# Patient Record
(demographics unavailable — no encounter records)

---

## 2024-12-07 NOTE — DISCUSSION/SUMMARY
[] : The components of the vaccine(s) to be administered today are listed in the plan of care. The disease(s) for which the vaccine(s) are intended to prevent and the risks have been discussed with the caretaker.  The risks are also included in the appropriate vaccination information statements which have been provided to the patient's caregiver.  The caregiver has given consent to vaccinate. [FreeTextEntry1] :  Well 12 year F.  Continue balanced diet with all food groups. Brush teeth twice a day with toothbrush. Recommend visit to dentist. Help child to maintain consistent daily routines and sleep schedule. Personal hygiene and puberty explained. School discussed. Ensure home is safe. Teach child about personal safety. Use consistent, positive discipline. Limit screen time to no more than 2 hours per day. Encourage physical activity.  -Imms: HPV, Flu, Covid -Labs: None needed -Next well visit in 1 year.

## 2024-12-07 NOTE — HISTORY OF PRESENT ILLNESS
[Mother] : mother [Has ways to cope with stress] : has ways to cope with stress [Displays self-confidence] : displays self-confidence [Grade: ____] : Grade: [unfilled] [Normal Performance] : normal performance [Normal Behavior/Attention] : normal behavior/attention [Normal Homework] : normal homework [Yes] : Patient goes to dentist yearly [Toothpaste] : Primary Fluoride Source: Toothpaste [Premenarche] : premenarche [Eats meals with family] : eats meals with family [Is permitted and is able to make independent decisions] : Is permitted and is able to make independent decisions [Eats regular meals including adequate fruits and vegetables] : eats regular meals including adequate fruits and vegetables [Drinks non-sweetened liquids] : drinks non-sweetened liquids  [Calcium source] : calcium source [Has friends] : has friends [At least 1 hour of physical activity a day] : at least 1 hour of physical activity a day [Has interests/participates in community activities/volunteers] : has interests/participates in community activities/volunteers. [Uses safety belts/safety equipment] : uses safety belts/safety equipment  [Has peer relationships free of violence] : has peer relationships free of violence [Has family members/adults to turn to for help] : has family members/adults to turn to for help [Sleep Concerns] : no sleep concerns [Has concerns about body or appearance] : does not have concerns about body or appearance [Uses electronic nicotine delivery system] : does not use electronic nicotine delivery system [Uses tobacco] : does not use tobacco [Uses drugs] : does not use drugs  [Drinks alcohol] : does not drink alcohol [Impaired/distracted driving] : no impaired/distracted driving [Has problems with sleep] : does not have problems with sleep [Gets depressed, anxious, or irritable/has mood swings] : does not get depressed, anxious, or irritable/has mood swings [Has thought about hurting self or considered suicide] : has not thought about hurting self or considered suicide [FreeTextEntry7] : Dizziness and nausea resolved. [de-identified] : None

## 2025-01-03 NOTE — PHYSICAL EXAM
[NL] : no acute distress, alert [de-identified] : moderate edema and ecchymoses R ankle with tenderness at medial and lateral malleoli, limited extension 2/2 pain but otherwise full ROM at ankle. unable to bear weight on R.

## 2025-01-03 NOTE — HISTORY OF PRESENT ILLNESS
[de-identified] : Ankle Sprain [FreeTextEntry6] : 1 week ago was riding around on new heelies and R ankle "collapsed". Seen at , told unlikely to be a fracture but couldn't fully eval growth plate. Placed in cam walker boot and crutches and told to f/u here. Doing RICE which helps. Overall feels better and less swollen but still with significant pain and having difficulty bearing weight on that foot.

## 2025-01-03 NOTE — DISCUSSION/SUMMARY
[FreeTextEntry1] : Likely ankle sprain but given inability to bear weight 1 week from injury rec eval with ortho or sports med. Referral info given. In the interim, cont RICE, cam walker boot, WBAT. Provided with elevator pass and gym excuse for 2 weeks. Parents to call if needs an extension.

## 2025-01-10 NOTE — DATA REVIEWED
[de-identified] : 3 views right  radiographs were obtained  and independently reviewed during today's visit  01/09/25. SH1 distal tibia  fracture with interval healing. Bones are in normal alignment. Joint spaces are preserved

## 2025-01-10 NOTE — REVIEW OF SYSTEMS
[Change in Activity] : change in activity [Limping] : limping [Joint Swelling] : joint swelling  [Fever Above 102] : no fever [Itching] : no itching [Redness] : no redness [Sore Throat] : no sore throat [Wheezing] : no wheezing [Cough] : no cough [Change in Appetite] : no change in appetite [Vomiting] : no vomiting [Joint Pains] : no arthralgias

## 2025-01-10 NOTE — PHYSICAL EXAM
[FreeTextEntry1] : General: Patient is awake and alert and in no acute distress . oriented to person, place. well developed, well nourished, cooperative.   Skin: The skin is intact, warm, pink, and dry over the area examined.    Eyes: normal conjunctiva, normal eyelids and pupils were equal and round.   ENT: normal ears, normal nose and normal lips.  Cardiovascular: There is brisk capillary refill in the digits of the affected extremity. They are symmetric pulses in the bilateral upper and lower extremities, positive peripheral pulses, brisk capillary refill, but no peripheral edema.  Respiratory: The patient is in no apparent respiratory distress. They're taking full deep breaths without use of accessory muscles or evidence of audible wheezes or stridor without the use of a stethoscope, normal respiratory effort.   Neurological: 5/5 motor strength in the main muscle groups of bilateral lower extremities, sensory intact in bilateral lower extremities.   Musculoskeletal:  pain with weight bearing , using crutches right ankle with no gross deformity, swelling and pain in distal tibia  no pain over the ATFL OR DELTOID, NO PAIN OVER LATERAL MALLEOLUS Mild pain with ankle ROM NV intact

## 2025-01-10 NOTE — REASON FOR VISIT
[Initial Evaluation] : an initial evaluation [Patient] : patient [Father] : father [FreeTextEntry1] :  right ankle injury

## 2025-01-10 NOTE — ASSESSMENT
[FreeTextEntry1] : 11 yo girl with right ankle SH1 distal tibia fracture DOI 12/27/24 Today's visit included obtaining history from the child  parent due to the child's age, the child could not be considered a reliable historian, requiring parent to act as independent historian. Xray was reviewed today demonstrating INTERVAL HEALING  and Long discussion was done with family regarding  diagnosis, treatment options and prognosis She will stay in the boot, non weight bearing for 3 more weeks No gym/sports for 3 weeks  Follow up in 3 weeks for reevaluation and Xray of the right ankle Pain medication as needed This plan was discussed with family. Family verbalizes understanding and agreement of plan. All questions and concerns were addressed today.

## 2025-01-10 NOTE — HISTORY OF PRESENT ILLNESS
[FreeTextEntry1] : Nasima is a pleasant 13 yo girl who came today to my office with her  dad  for evaluation of  right ankle injury She sprained her  right ankle while skating on 12/27/24 . she immediate experienced pain with weight bearing and ankle ROM.  She was seen in urgent care, Xray was done - no fracture was diagnosed, but since she had a lot of pain she was treated with  a boot and was instructed to follow with peds ortho, She is still having pain and therefore she came for evaluation of the above.

## 2025-01-31 NOTE — REASON FOR VISIT
[Follow Up] : a follow up visit [Patient] : patient [Father] : father [FreeTextEntry1] :  right ankle distal tibia fracture

## 2025-01-31 NOTE — HISTORY OF PRESENT ILLNESS
[FreeTextEntry1] : Nasima is a pleasant 11 yo girl who came today to my office with her  dad  for evaluation of  right ankle injury She sprained her  right ankle while skating on 12/27/24 . she immediate experienced pain with weight bearing and ankle ROM.  She was seen in urgent care, Xray was done - no fracture was diagnosed, but since she had a lot of pain she was treated with  a boot and was instructed to follow with peds ortho, Last visit  SH1 OF THE DISTAL TIBIA was diagnosed  and she was instructed to remain out of gym/sport. She is here today, doing better, no pain

## 2025-01-31 NOTE — REVIEW OF SYSTEMS
[Limping] : limping [Change in Activity] : no change in activity [Fever Above 102] : no fever [Itching] : no itching [Redness] : no redness [Sore Throat] : no sore throat [Wheezing] : no wheezing [Cough] : no cough [Change in Appetite] : no change in appetite [Vomiting] : no vomiting [Joint Pains] : no arthralgias [Joint Swelling] : no joint swelling

## 2025-01-31 NOTE — DATA REVIEWED
[de-identified] : 3 views right  radiographs were obtained  and independently reviewed during today's visit  01/31 /25. SH1 distal tibia  fracture with interval healing. Bones are in normal alignment. Joint spaces are preserved

## 2025-01-31 NOTE — PHYSICAL EXAM
[FreeTextEntry1] : General: Patient is awake and alert and in no acute distress . oriented to person, place. well developed, well nourished, cooperative.   Skin: The skin is intact, warm, pink, and dry over the area examined.    Eyes: normal conjunctiva, normal eyelids and pupils were equal and round.   ENT: normal ears, normal nose and normal lips.  Cardiovascular: There is brisk capillary refill in the digits of the affected extremity. They are symmetric pulses in the bilateral upper and lower extremities, positive peripheral pulses, brisk capillary refill, but no peripheral edema.  Respiratory: The patient is in no apparent respiratory distress. They're taking full deep breaths without use of accessory muscles or evidence of audible wheezes or stridor without the use of a stethoscope, normal respiratory effort.   Neurological: 5/5 motor strength in the main muscle groups of bilateral lower extremities, sensory intact in bilateral lower extremities.   Musculoskeletal: able to bear weight with minimal pain  right ankle with no gross deformity,  resolved swelling and pain in distal tibia  no pain over the ATFL OR DELTOID, NO PAIN OVER LATERAL MALLEOLUS Mild pain with ankle ROM NV intact

## 2025-01-31 NOTE — ASSESSMENT
[FreeTextEntry1] : 13 yo girl with right ankle SH1 distal tibia fracture DOI 12/27/24 Today's visit included obtaining history from the child  parent due to the child's age, the child could not be considered a reliable historian, requiring parent to act as independent historian. Xray was reviewed today demonstrating INTERVAL HEALING  and Long discussion was done with family regarding  diagnosis, treatment options and prognosis At this point she will start PT for gait training and ankle ROM Full  weight bearing in a boot  No gym/sports for 4 weeks  Follow up in 4 weeks for reevaluation and Xray of the right ankle Pain medication as needed This plan was discussed with family. Family verbalizes understanding and agreement of plan. All questions and concerns were addressed today.

## 2025-02-28 NOTE — DATA REVIEWED
[de-identified] : 3 views right  radiographs were obtained  and independently reviewed during today's visit  02/28/25. SH1 distal tibia  fracture with interval healing. Bones are in normal alignment. Joint spaces are preserved

## 2025-02-28 NOTE — HISTORY OF PRESENT ILLNESS
[FreeTextEntry1] : Nasima is a pleasant 11 yo girl who came today to my office with her  dad  for evaluation of  right ankle injury She sprained her  right ankle while skating on 12/27/24 . she immediate experienced pain with weight bearing and ankle ROM.  She was seen in urgent care, Xray was done - no fracture was diagnosed, but since she had a lot of pain she was treated with  a boot and was instructed to follow with peds ortho, Last visit  CAST WAS REMOVED, we converted her to a cam boot  and she was instructed to start PT and  remain out of gym/sport. She is here today, doing better, no pain

## 2025-02-28 NOTE — REASON FOR VISIT
[Follow Up] : a follow up visit [FreeTextEntry1] :  right ankle distal tibia fracture [Patient] : patient [Father] : father

## 2025-02-28 NOTE — ASSESSMENT
[FreeTextEntry1] : 13 yo girl with right ankle SH1 distal tibia fracture DOI 12/27/24 Today's visit included obtaining history from the child  parent due to the child's age, the child could not be considered a reliable historian, requiring parent to act as independent historian. Xray was reviewed today demonstrating INTERVAL HEALING  and Long discussion was done with family regarding  diagnosis, treatment options and prognosis At this point she will  continue   PT for strengthening  and ankle ROM Full  weight bearing , start to wean the boot and crutches No gym/sports for 4 weeks  Follow up in 4 weeks for reevaluation  Pain medication as needed This plan was discussed with family. Family verbalizes understanding and agreement of plan. All questions and concerns were addressed today.

## 2025-02-28 NOTE — PHYSICAL EXAM
[FreeTextEntry1] : General: Patient is awake and alert and in no acute distress . oriented to person, place. well developed, well nourished, cooperative.   Skin: The skin is intact, warm, pink, and dry over the area examined.    Eyes: normal conjunctiva, normal eyelids and pupils were equal and round.   ENT: normal ears, normal nose and normal lips.  Cardiovascular: There is brisk capillary refill in the digits of the affected extremity. They are symmetric pulses in the bilateral upper and lower extremities, positive peripheral pulses, brisk capillary refill, but no peripheral edema.  Respiratory: The patient is in no apparent respiratory distress. They're taking full deep breaths without use of accessory muscles or evidence of audible wheezes or stridor without the use of a stethoscope, normal respiratory effort.   Neurological: 5/5 motor strength in the main muscle groups of bilateral lower extremities, sensory intact in bilateral lower extremities.   Musculoskeletal: able to bear weight with  no pain, mild limping right ankle with no gross deformity,  resolved swelling and pain in distal tibia  no pain over the ATFL OR DELTOID, NO PAIN OVER LATERAL MALLEOLUS NO  pain with ankle ROM NV intact

## 2025-02-28 NOTE — REVIEW OF SYSTEMS
[Change in Activity] : no change in activity [Fever Above 102] : no fever [Itching] : no itching [Redness] : no redness [Sore Throat] : no sore throat [Wheezing] : no wheezing [Cough] : no cough [Change in Appetite] : no change in appetite [Vomiting] : no vomiting [Limping] : limping [Joint Pains] : no arthralgias [Joint Swelling] : no joint swelling

## 2025-03-25 NOTE — HISTORY OF PRESENT ILLNESS
[de-identified] : Cough [FreeTextEntry6] : x2 days. Felt feverish yesterday but temp was 99. Sore throat, hurts more when coughing. Cough is dry, doesn't bring anything up. Kept them up overnight. Tried Robitussin which maybe helped for a little. Not really congested. No stomachache. No v/d. No ear pain. No itchy eyes or throat. Doesn't have asthma but sibling does.

## 2025-03-25 NOTE — DISCUSSION/SUMMARY
[FreeTextEntry1] : URI with bronchospastic cough but no wheezing. Given 2 puffs of albuterol with spacer which maybe helped? Rec trial at home prn. Reviewed how to use MDI and spacer and spacer with mouthpiece dispensed. RTC if worsening or other new symptoms.

## 2025-03-28 NOTE — DATA REVIEWED
[de-identified] : 3 views right  radiographs were obtained  and independently reviewed during today's visit  02/28/25. SH1 distal tibia  fracture with interval healing. Bones are in normal alignment. Joint spaces are preserved

## 2025-03-28 NOTE — HISTORY OF PRESENT ILLNESS
[FreeTextEntry1] : Nasima is a pleasant 13 yo girl who came today to my office with her  dad  for evaluation of  right ankle injury She sprained her  right ankle while skating on 12/27/24 . she immediate experienced pain with weight bearing and ankle ROM.  She was seen in urgent care, Xray was done - no fracture was diagnosed, but since she had a lot of pain she was treated with  a boot and was instructed to follow with peds ortho, Last visit   she was instructed to start PT and  remain out of gym/sport. She is here today, doing better, no pain

## 2025-03-28 NOTE — PHYSICAL EXAM
[FreeTextEntry1] : General: Patient is awake and alert and in no acute distress . oriented to person, place. well developed, well nourished, cooperative.   Skin: The skin is intact, warm, pink, and dry over the area examined.    Eyes: normal conjunctiva, normal eyelids and pupils were equal and round.   ENT: normal ears, normal nose and normal lips.  Cardiovascular: There is brisk capillary refill in the digits of the affected extremity. They are symmetric pulses in the bilateral upper and lower extremities, positive peripheral pulses, brisk capillary refill, but no peripheral edema.  Respiratory: The patient is in no apparent respiratory distress. They're taking full deep breaths without use of accessory muscles or evidence of audible wheezes or stridor without the use of a stethoscope, normal respiratory effort.   Neurological: 5/5 motor strength in the main muscle groups of bilateral lower extremities, sensory intact in bilateral lower extremities.   Musculoskeletal: ambulate with no limping or pain right ankle with no gross deformity,  resolved swelling and pain in distal tibia  no pain over the ATFL OR DELTOID, NO PAIN OVER LATERAL MALLEOLUS NO  pain with ankle ROM NV intact  calf muscle is equal to the left side

## 2025-03-28 NOTE — REVIEW OF SYSTEMS
[Change in Activity] : no change in activity [Fever Above 102] : no fever [Itching] : no itching [Redness] : no redness [Sore Throat] : no sore throat [Wheezing] : no wheezing [Cough] : no cough [Change in Appetite] : no change in appetite [Vomiting] : no vomiting [Limping] : no limping [Joint Pains] : no arthralgias [Joint Swelling] : no joint swelling

## 2025-03-28 NOTE — ASSESSMENT
[FreeTextEntry1] : 11 yo girl with right ankle SH1 distal tibia fracture DOI 12/27/24 Today's visit included obtaining history from the child  parent due to the child's age, the child could not be considered a reliable historian, requiring parent to act as independent historian. Xray  from last visit was reviewed today demonstrating INTERVAL HEALING  and Long discussion was done with family regarding  diagnosis, treatment options and prognosis Recommendation at this time would be no further restriction she will resume activities as tolerated follow up as needed This plan was discussed with family. Family verbalizes understanding and agreement of plan. All questions and concerns were addressed today.